# Patient Record
Sex: FEMALE | Race: WHITE
[De-identification: names, ages, dates, MRNs, and addresses within clinical notes are randomized per-mention and may not be internally consistent; named-entity substitution may affect disease eponyms.]

---

## 2021-05-10 NOTE — P.OP
Date of Procedure: 05/10/21


Preoperative Diagnosis: 


Ruptured right ectopic pregnancy with hemoperitoneum


Postoperative Diagnosis: 


Same


Procedure(s) Performed: 


#1: Exploratory laparotomy.  2: Right salpingectomy with excision of ectopic 

pregnancy


Anesthesia: MENA


Surgeon: Enrique Mosher


Assistant #1: Eunice Tejada


Estimated Blood Loss (ml): 50


Pathology: other (Right fallopian tube with ectopic pregnancy)


Condition: stable


Disposition: PACU


Indications for Procedure: 


please see dictated H&P for intimate details of this patient's admission.  In 

brief summary this is a pleasant 30-year-old  1 para 0 female 

approximately 8 weeks pregnant who is transferred by EMS from [facility]

with suspected ruptured right ectopic pregnancy, hemoperitoneum, and 

hypotension.  Patient was getting 2 units of blood on arrival to the emergency 

department and evaluation is consistent with a ruptured ectopic pregnancy.  I 

discuss recently with her and her  and we elected proceed immediately 

with exploratory laparotomy possible excision of her right fallopian tube.  I 

did explain the surgery and risks and risks of infection, bleeding, possible 

injury bowel, bladder, vessels, and/or other organs.  All the patient's 

questions are answered written consent is obtained.


Operative Findings: 


This patient had approximately 2000 mL of hemoperitoneum.  There was active 

bleeding from what appeared to be a right ruptured ectopic pregnancy.  The 

ectopic pregnancy encompassing entire tube right at the cornual insertion of the

uterus.  The uterus appeared normal.  The left fallopian tube and ovary appeared

normal.  The right ovary appear normal.


Description of Procedure: 


This patient is taken to the operating room where she is laid in the supine 

position.  She subsequently undergoes general endotracheal anesthesia without 

incident.  With an adequate level of anesthesia she has an abdominal prep and 

drape.  She has a Lane catheter placed to straight drain.  Scalpel is taken and

a Pfannenstiel skin incision is then made.  A second scalpel is taken down the 

fascia and the fascia scored with a knife.  Fascial incision extended 

bilaterally using the Mcdowell scissors.  Fascia is then dissected sharply off the 

rectus muscles.  The rectus muscles are  the peritoneum identified and 

entered sharply.  Immediately at this time there is loss of a large amount of 

dark red blood.  Peritoneal incision is extended.  Copious amount of blood and 

clots are removed at this time.  Paterson retractor is then placed.  The bowel 

was packed up out of the pelvis with a wet laparotomy sponge.  Inspection of the

pelvis shows a normal uterus and left tube and ovary.  The right fallopian tube 

is completely encompassed by an ectopic pregnancy which appears to have 

ruptured.  There is some active bleeding going on.  At this point it is evident 

due to the location of the ectopic and the's large size of the ectopic that is 

impossible to salvage the right fallopian tube and is in this best interest of 

this patient to remove the right fallopian tube.  2 curved Heaneys are placed 

across the right fallopian tube.  This does stop the bleeding and the tube was 

excised with the ectopic pregnancy.  Figure-of-eight sutures are done with 0 

Vicryl 2 for added hemostasis including a free tie.  Excellent hemostasis is 

noted.  At this time we carefully evacuate the rest of the hemoperitoneum.  

Careful irrigation is done as well.  Final inspection of the right adnexa shows 

to be hemostatic.  This done I retractor and sponges are removed.  All counts 

are correct 3.  The peritoneum was then closed using an 0 Vicryl.  Rectus musc

les reapproximated in 0 Vicryl interrupted fashion.  Fascia is then closed using

0 PDS.  Subcutaneous tissues and closed using a 3-0 Vicryl.  Skin is and closed 

using staples.  All counts are correct 3.  There are no complications.  Patient

is awakened from anesthesia and taken recovery room in satisfactory condition.

## 2021-05-10 NOTE — P.HPOB
History of Present Illness


H&P Date: 05/10/21


Chief Complaint: Abdominal pain, ruptured ectopic pregnancy





This patient is a 30-year-old  1 para 0 female who was transferred from 

[facility] at this time for evaluation of ruptured ectopic pregnancy, 

hypotension.  Patient is alert and states that she knew she was pregnant and 

that she did see a OB/GYN doctor but not at this facility.  She began having 

severe abdominal pain today and went to the local hospital.  At that time she 

was noted to have a ectopic with crown-rump length in the right adnexa and a 

large area of heterogeneous.  Patient was hypotensive and tachycardic subsequent

given 2 units of blood and transferred here.  Patient continues to be 

symptomatic with an acute abdomen.  Patient states she has no other significant 

past medical history.





Review of Systems


Genitourinary: Reports as per HPI





Past Medical History


Past Medical History: No Reported History


History of Any Multi-Drug Resistant Organisms: None Reported


Past Surgical History: No Surgical Hx Reported


Past Psychological History: Anxiety, Depression


Smoking Status: Never smoker


Past Alcohol Use History: None Reported


Past Drug Use History: None Reported





Medications and Allergies


                                    Allergies











Allergy/AdvReac Type Severity Reaction Status Date / Time


 


No Known Allergies Allergy   Verified 05/10/21 15:17














Exam


                                   Vital Signs











  Temp Pulse Resp BP Pulse Ox


 


 05/10/21 15:25   94  18  97/53  100


 


 05/10/21 15:13  97.5 F L  107 H  18  94/60  100








                                Intake and Output











 05/10/21 05/10/21 05/10/21





 06:59 14:59 22:59


 


Other:   


 


  Weight   70.307 kg














Results





Ultrasound shows a 7 week 5 day crown-rump length right adnexa with a large area

of heterogeneous echotexture consistent with probably blood.





Assessment and Plan


Assessment: 





This is a 30-year-old  1 para 0 female approximately 8 weeks with what 

appears to be a ruptured right ectopic pregnancy and hypotension with 

tachycardia status post 2 units of blood.  I discussed with the patient and her 

partner and we elected to proceed immediately to the operating room for 

exploratory laparotomy.  I did discuss the possibility that she may end up 

having to have her right fallopian tube removed.  I also discussed briefly the 

risks of surgery.  All the patient's questions are answered and a consent is 

obtained.


(1) Ruptured right tubal ectopic pregnancy causing hemoperitoneum


Current Visit: Yes   Status: Acute   Code(s): O00.101 - RIGHT TUBAL PREGNANCY 

WITHOUT INTRAUTERINE PREGNANCY; K66.1 - HEMOPERITONEUM   SNOMED Code(s): 

44041058

## 2021-05-10 NOTE — ED
General Adult HPI





- General


Stated complaint: ectopic pregnancy


Time Seen by Provider: 05/10/21 15:14


Source: patient


Mode of arrival: EMS


Limitations: no limitations





- History of Present Illness


Initial comments: 


Dictation was produced using dragon dictation software. please excuse any 

grammatical, word or spelling errors. 





This patient was cared for during a federal and state declared state of 

emergency secondary to Covid 19





Chief Complaint: 30-year-old feel presents to our ER for ectopic pregnancy





History of Present Illness: Patient is a 30-year-old female presents to the 

emergency department for ectopic pregnancy.  Patient was initially evaluated at 

[facility] where she was found to have an ectopic pregnancy.  Should 

borderline blood pressures there.  She was given 2 boluses of IV fluids and 

started on blood transfusion.  Patient has a gynecologist named Dr. Beatty and 

outside city.  Patient's pain initially started today after having sexual 

intercourse.  Patient had an ultrasound performed at [facility] and 

showed ectopic pregnancy.








The ROS documented in this emergency department record has been reviewed and 

confirmed by me.  Those systems with pertinent positive or negative responses 

have been documented in the HPI.  All other systems are other negative and/or 

noncontributory.








PHYSICAL EXAM:


General Impression: Alert and oriented x3, pale, lethargic


HEENT: Normocephalic atraumatic, extra-ocular movements intact, pupils equal and

reactive to light bilaterally, mucous membranes moist.


Cardiovascular: Heart regular rate and rhythm


Chest: Able to complete full sentences, no retractions, no tachypnea


Abdomen: abdomen soft, mild diffuse tenderness, non-distended, no organomegaly


Musculoskeletal: Pulses present and equal in all extremities, no peripheral 

edema


Motor:  no focal deficits noted


Neurological: CN II-XII grossly intact, no focal motor or sensory deficits noted


Skin: Intact with no visualized rashes


Psych: Normal affect and mood





ED course: 22-year-old female presents emergency department for ectopic 

pregnancy.  Vital signs upon arrival shows heart rate 107, blood pressure 94/60,

rest of vital signs within acceptable limits.  OB/GYN was called prior to 

patient's arrival.  Case discussed with Dr. Mosher will take patient to the 

operating room.




















- Related Data


                                    Allergies











Allergy/AdvReac Type Severity Reaction Status Date / Time


 


No Known Allergies Allergy   Verified 05/10/21 15:17














Review of Systems


ROS Statement: 


Those systems with pertinent positive or pertinent negative responses have been 

documented in the HPI.





ROS Other: All systems not noted in ROS Statement are negative.





Past Medical History


Past Medical History: No Reported History


History of Any Multi-Drug Resistant Organisms: None Reported


Past Surgical History: No Surgical Hx Reported


Past Psychological History: Anxiety, Depression


Smoking Status: Never smoker


Past Alcohol Use History: None Reported


Past Drug Use History: None Reported





General Exam


Limitations: no limitations





Course





                                   Vital Signs











  05/10/21





  15:13


 


Temperature 97.5 F L


 


Pulse Rate 107 H


 


Respiratory 18





Rate 


 


Blood Pressure 94/60


 


O2 Sat by Pulse 100





Oximetry 














Disposition


Clinical Impression: 


 Ectopic pregnancy





Disposition: ADMITTED AS IP TO THIS HOSP


Condition: Critical


Referrals: 


Sisi Gloria, PAC [Primary Care Provider] - 1-2 days


Decision Time: 15:20

## 2021-05-11 NOTE — P.PN
Progress Note - Text


Progress Note Date: 05/11/21





Postoperative day #1.  Patient is resting without new complaints.  Vital signs 

are stable and she is afebrile.  Repeat CBC last evening showed a hemoglobin 

11.6.  White count was still elevated this thought to be secondary to the 

hemoperitoneum.  I am going to continue her IV antibiotics, check a CBC this 

morning, discontinue her catheter, and encourage ambulation.  We will also 

advance her diet.

## 2021-05-12 NOTE — P.DS
Providers


Date of admission: 


05/10/21 15:21





Expected date of discharge: 21


Attending physician: 


Enrique Mosher





Primary care physician: 


Sisi Gloria








- Discharge Diagnosis(es)


(1) Ruptured right tubal ectopic pregnancy causing hemoperitoneum


Current Visit: Yes   Status: Acute   


Hospital Course: 





Please see dictated H&P for intimate details of this patient's admission.  Brief

summary this is a pleasant 30-year-old  1 para 0 female transferred from 

[facility] for ruptured ectopic pregnancy.  Patient was immediately 

taken to the operating room and exploratory laparotomy was done for a large 

hemoperitoneum with ruptured right ectopic pregnancy.  Please see dictated 

operative note.  Postoperatively the patient did well and serial CBCs level 

about the time of this dictation 8.3.  Patient is tolerating regular diet and 

having adequate pain control.  Still for discharge home follow up with me in 1 

week.


Procedures: 





Exploratory laparotomy and right salpingectomy with excision of right ectopic 

pregnancy


Patient Condition at Discharge: Critical





Plan - Discharge Summary


New Discharge Prescriptions: 


New


   Ferrous Sulfate [Iron (65 MG Elemental)] 325 mg PO BID-W/MEALS #60 tab


   Ibuprofen [Motrin] 600 mg PO Q6HR PRN #30 tab


     PRN Reason: Mild Discomfort


   Acetaminophen-Codeine 300-30mg [Tylenol w/codeine #3] 2 each PO Q6HR PRN #24 

tab


     PRN Reason: Severe Pain


Discharge Medication List





Acetaminophen-Codeine 300-30mg [Tylenol w/codeine #3] 2 each PO Q6HR PRN #24 tab

21 [Rx]


Ferrous Sulfate [Iron (65 MG Elemental)] 325 mg PO BID-W/MEALS #60 tab 21 

[Rx]


Ibuprofen [Motrin] 600 mg PO Q6HR PRN #30 tab 21 [Rx]








Follow up Appointment(s)/Referral(s): 


Enrique Mosher MD [STAFF PHYSICIAN] - 6 Weeks (Please see me in 1 week for an 

incision check.)


Patient Instructions/Handouts:  Exploratory Laparotomy (DC), Salpingectomy (DC),

Ectopic Pregnancy (GEN)


Activity/Diet/Wound Care/Special Instructions: 


No heavy lifting or strenuous activity for 6 weeks.  No intercourse or anything 

per vagina for 6 weeks.  Please call if any fever, chills, excessive vaginal 

bleeding, and/or abdominal pain.


Discharge Disposition: HOME SELF-CARE

## 2021-05-12 NOTE — P.PN
Progress Note - Text


Progress Note Date: 05/12/21





Postoperative day #2.  Patient is resting without new complaints.  Vital signs 

are stable she's afebrile.  Incision is intact and dry.  Hemoglobin yesterday 

was 8.3 which is felt to be appropriate for the amount of blood she lost her to 

surgery.  Patient is tolerating regular diet, ambulating, urinating without 

difficulty.  She wishes to go home.  Plan today is to repeat her CBC and if 

continues to do well discharge home this morning after breakfast.

## 2022-12-17 ENCOUNTER — HOSPITAL ENCOUNTER (OUTPATIENT)
Dept: HOSPITAL 47 - FBPOP | Age: 32
End: 2022-12-17
Attending: OBSTETRICS & GYNECOLOGY
Payer: COMMERCIAL

## 2022-12-17 VITALS
DIASTOLIC BLOOD PRESSURE: 69 MMHG | HEART RATE: 88 BPM | SYSTOLIC BLOOD PRESSURE: 114 MMHG | RESPIRATION RATE: 16 BRPM | TEMPERATURE: 97.6 F

## 2022-12-17 DIAGNOSIS — Z3A.39: ICD-10-CM

## 2022-12-17 PROCEDURE — 59025 FETAL NON-STRESS TEST: CPT

## 2022-12-17 PROCEDURE — 99213 OFFICE O/P EST LOW 20 MIN: CPT

## 2022-12-19 ENCOUNTER — HOSPITAL ENCOUNTER (OUTPATIENT)
Dept: HOSPITAL 47 - FBPOP | Age: 32
Discharge: HOME | End: 2022-12-19
Attending: OBSTETRICS & GYNECOLOGY
Payer: COMMERCIAL

## 2022-12-19 VITALS
TEMPERATURE: 97.5 F | DIASTOLIC BLOOD PRESSURE: 76 MMHG | HEART RATE: 85 BPM | RESPIRATION RATE: 18 BRPM | SYSTOLIC BLOOD PRESSURE: 117 MMHG

## 2022-12-19 DIAGNOSIS — O47.1: Primary | ICD-10-CM

## 2022-12-19 DIAGNOSIS — Z3A.39: ICD-10-CM

## 2022-12-19 PROCEDURE — 99213 OFFICE O/P EST LOW 20 MIN: CPT

## 2022-12-19 PROCEDURE — 59025 FETAL NON-STRESS TEST: CPT

## 2022-12-19 PROCEDURE — 84112 EVAL AMNIOTIC FLUID PROTEIN: CPT

## 2022-12-20 ENCOUNTER — HOSPITAL ENCOUNTER (INPATIENT)
Dept: HOSPITAL 47 - FBPOP | Age: 32
LOS: 2 days | Discharge: HOME | End: 2022-12-22
Attending: OBSTETRICS & GYNECOLOGY | Admitting: OBSTETRICS & GYNECOLOGY
Payer: COMMERCIAL

## 2022-12-20 DIAGNOSIS — Z79.899: ICD-10-CM

## 2022-12-20 DIAGNOSIS — Z67.11: ICD-10-CM

## 2022-12-20 DIAGNOSIS — Z3A.39: ICD-10-CM

## 2022-12-20 DIAGNOSIS — Z28.310: ICD-10-CM

## 2022-12-20 DIAGNOSIS — Z86.59: ICD-10-CM

## 2022-12-20 DIAGNOSIS — O26.893: ICD-10-CM

## 2022-12-20 LAB
BASOPHILS # BLD AUTO: 0 K/UL (ref 0–0.2)
BASOPHILS NFR BLD AUTO: 0 %
EOSINOPHIL # BLD AUTO: 0.1 K/UL (ref 0–0.7)
EOSINOPHIL NFR BLD AUTO: 1 %
ERYTHROCYTE [DISTWIDTH] IN BLOOD BY AUTOMATED COUNT: 4.32 M/UL (ref 3.8–5.4)
ERYTHROCYTE [DISTWIDTH] IN BLOOD: 15.5 % (ref 11.5–15.5)
HCT VFR BLD AUTO: 37 % (ref 34–46)
HGB BLD-MCNC: 12.1 GM/DL (ref 11.4–16)
LYMPHOCYTES # SPEC AUTO: 1.2 K/UL (ref 1–4.8)
LYMPHOCYTES NFR SPEC AUTO: 7 %
MCH RBC QN AUTO: 28 PG (ref 25–35)
MCHC RBC AUTO-ENTMCNC: 32.7 G/DL (ref 31–37)
MCV RBC AUTO: 85.5 FL (ref 80–100)
MONOCYTES # BLD AUTO: 0.4 K/UL (ref 0–1)
MONOCYTES NFR BLD AUTO: 3 %
NEUTROPHILS # BLD AUTO: 13.8 K/UL (ref 1.3–7.7)
NEUTROPHILS NFR BLD AUTO: 88 %
PLATELET # BLD AUTO: 246 K/UL (ref 150–450)
WBC # BLD AUTO: 15.7 K/UL (ref 3.8–10.6)

## 2022-12-20 PROCEDURE — 86870 RBC ANTIBODY IDENTIFICATION: CPT

## 2022-12-20 PROCEDURE — 86850 RBC ANTIBODY SCREEN: CPT

## 2022-12-20 PROCEDURE — 3E0234Z INTRODUCTION OF SERUM, TOXOID AND VACCINE INTO MUSCLE, PERCUTANEOUS APPROACH: ICD-10-PCS

## 2022-12-20 PROCEDURE — 85461 HEMOGLOBIN FETAL: CPT

## 2022-12-20 PROCEDURE — 99213 OFFICE O/P EST LOW 20 MIN: CPT

## 2022-12-20 PROCEDURE — 85025 COMPLETE CBC W/AUTO DIFF WBC: CPT

## 2022-12-20 PROCEDURE — 84112 EVAL AMNIOTIC FLUID PROTEIN: CPT

## 2022-12-20 PROCEDURE — 86880 COOMBS TEST DIRECT: CPT

## 2022-12-20 PROCEDURE — 59025 FETAL NON-STRESS TEST: CPT

## 2022-12-20 PROCEDURE — 86900 BLOOD TYPING SEROLOGIC ABO: CPT

## 2022-12-20 PROCEDURE — 86901 BLOOD TYPING SEROLOGIC RH(D): CPT

## 2022-12-20 RX ADMIN — DOCUSATE SODIUM AND SENNOSIDES SCH EACH: 50; 8.6 TABLET ORAL at 22:09

## 2022-12-20 RX ADMIN — POTASSIUM CHLORIDE SCH MLS/HR: 14.9 INJECTION, SOLUTION INTRAVENOUS at 11:03

## 2022-12-20 RX ADMIN — POTASSIUM CHLORIDE SCH MLS/HR: 14.9 INJECTION, SOLUTION INTRAVENOUS at 22:45

## 2022-12-20 RX ADMIN — ACETAMINOPHEN SCH MG: 500 TABLET ORAL at 22:09

## 2022-12-20 NOTE — P.HPOB
History of Present Illness


H&P Date: 22


Chief Complaint: Leaking fluid and contractions





This patient is a pleasant 31-year-old  2 para 0 female estimated date of

confinement 2022 estimated gestational age 39-3/7 weeks who presents to 

labor and delivery with complaints of leaking fluid and contractions.  Patient's

found to have a positive amnio sure in active early labor.  Prenatal care has 

been uncomplicated.





Review of Systems


Genitourinary: Reports pregnant


Menstruation: Reports amenorrhea





Past Medical History


Past Medical History: No Reported History


Additional Past Medical History / Comment(s): Chronic Bronchitits


History of Any Multi-Drug Resistant Organisms: None Reported


Additional Past Surgical History / Comment(s): Patient had a right salpingectomy

with excision of ectopic pregnancy and May of last year.  Patient also had a 

cyst removed from her lip.


Past Anesthesia/Blood Transfusion Reactions: No Reported Reaction


Past Psychological History: Anxiety, Depression


Smoking Status: Never smoker


Past Alcohol Use History: None Reported


Past Drug Use History: None Reported





- Past Family History


  ** Mother


Family Medical History: Cancer, Chest Pain / Angina, Congestive Heart Failure 

(CHF), Coronary Artery Disease (CAD)





Medications and Allergies


                                Home Medications











 Medication  Instructions  Recorded  Confirmed  Type


 


Prenatal Vit No.179/Iron/Folic 1 tab PO DAILY 22 History





[Prenatal Tablet]    








                                    Allergies











Allergy/AdvReac Type Severity Reaction Status Date / Time


 


No Known Allergies Allergy   Verified 22 04:59














Exam


                                   Vital Signs











  Temp Pulse Resp BP Pulse Ox


 


 22 05:16  97.2 F L  90  16  118/82  99


 


 22 05:00  97.2 F L  90  16  118/82  99








                                Intake and Output











 22





 14:59 22:59 06:59


 


Other:   


 


  Weight   83.007 kg














- OBG Physical Exam


Abdomen: bowel sounds normal, no diffuse tenderness, no bruit present, no 

guarding noted, no hepatomegaly, no splenomegaly, no mass


Vulva: both: normal


Cervix: no lesion (Cervix is 4-5 cm dilated completely effaced -1 station.), no 

discharge





Results





Prenatal blood work shows she is A-, rubella immune, RPR is nonreactive, 

hepatitis B negative, HIV is nonreactive, group B strep was negative, Glucola 

was abnormal but she had a normal three-hour gtt., patient received RhoGAM on 

.  Most recent ultrasound showed estimated fetal weight of 6 pounds at

the 50th percentile.


Result Diagrams: 


                                 22 05:27





                  Abnormal Lab Results - Last 24 Hours (Table)











  22 Range/Units





  05:27 


 


WBC  15.7 H  (3.8-10.6)  k/uL


 


Neutrophils #  13.8 H  (1.3-7.7)  k/uL














Assessment and Plan


Assessment: 





This is a pleasant 31-year-old  2 para 0 female 39-3/7 weeks who presents

to labor and delivery with spontaneous rupture membranes and active labor.  Plan

is anticipate vaginal delivery.


(1) 39 weeks gestation of pregnancy


Current Visit: Yes   Status: Acute   Code(s): Z3A.39 - 39 WEEKS GESTATION OF 

PREGNANCY   SNOMED Code(s): 51317007


   





(2) Spontaneous rupture of amniotic membranes


Current Visit: Yes   Status: Acute   Code(s): RYM8953 -    SNOMED Code(s): 

172989258


   





(3) Normal labor


Current Visit: Yes   Status: Acute   Code(s): O80 - ENCOUNTER FOR FULL-TERM 

UNCOMPLICATED DELIVERY; Z37.9 - OUTCOME OF DELIVERY, UNSPECIFIED   SNOMED 

Code(s): 30642842

## 2022-12-20 NOTE — P.MSEPDOC
Presenting Problems





- Arrival Data


Date of Arrival on Unit: 22


Time of Arrival on Unit: 05:06


Mode of Transport: Wheelchair





- Complaint


OB-Reason for Admission/Chief Complaint: Possible Onset of Labor


Comment: RN spoke with Dr. Mosher at 0456 regarding positive amnisure test, 

3.5/90/-2 cervical check. Orders given to RN  Patient will be admitted for 

labor.





Prenatal Medical History





- Pregnancy Information


: 2


Para: 0


Term: 0


: 0


Abortions: Spontaneous or Elective: 0


Number of Living Children: 0





- Gestational Age


Gestational Age by MILE (wks/days): 39 Weeks and 3 Days





Review of Systems





- Review of Systems


Constitutional: No problems


Breast: No problems


ENT: No problems


Cardiovascular: No problems


Respiratory: No problems


Gastrointestinal: No problems


Genitourinary: No problems


Musculoskeletal: No problems


Neurological: No problems


Skin: No problems





Vital Signs





- Temperature


Temperature: 97.2 F


Temperature Source: Temporal Artery Scan





- Pulse


  ** Pulse Oximetery


Pulse Rate: 90


Pulse Assessment Method: Automatic Cuff





- Respirations


Respiratory Rate: 16


Oxygen Delivery Method: Room Air


O2 Sat by Pulse Oximetry: 99





- Blood Pressure


  ** Right Arm


Blood Pressure: 118/82


Blood Pressure Mean: 94


Blood Pressure Source: Automatic Cuff





Medical Screen Scoring





- Cervical Exam


Dilation (cm): 3.5


Effacement (%): 90


Station: -2


Membranes: Ruptured





- Uterine Contractions


Frequency From (mins): 2


Frequency To (mins): 3


Duration From (seconds): 60


Duration To (seconds): 70


Intensity: Moderate


Resting: Soft to palpation





- Fetal Assessment - Baby A


Baseline FHR: 145


Fetal Heart Rate - NICHD Category: Category I (Normal)


NST: Reactive





Physician Notification





- Physician Notified


Physician Notified Date: 22


Physician Notified Time: 04:56


Physician: Dr. Davies


New Order Received: Yes





- Notification Comment


Comment: RN spoke with Dr. Mosher at 0456 regarding positive amnisure test, 

3.5/90/-2 cervical check. Orders given to RN  Patient will be admitted for 

labor.





Maternal Fetal Triage Index





- Non-Urgent/Priority 4


Non-Urgent Priority 4: Yes


Criteria Met for Priority 4: RN spoke with Dr. Mosher at 0456 regarding 

positive amnisure test, 3.5/90/-2 cervical check. Orders given to RN  Patient 

will be admitted for labor.





Disposition





- Disposition


OB Disposition: Admit


I agree with the RN Medical Screening Exam: Yes


Case reviewed; plan agreed upon as documented in EMR&OBIX.: Yes


Diagnosis: ENCOUNTER FOR FULL-TERM UNCOMPLICATED DELIVERY

## 2022-12-20 NOTE — P.MSEPDOC
Presenting Problems





- Arrival Data


Date of Arrival on Unit: 22


Time of Arrival on Unit: 12:30


Mode of Transport: Ambulatory





- Complaint


OB-Reason for Admission/Chief Complaint: Rule Out SROM





Prenatal Medical History





- Pregnancy Information


: 2


Para: 0


Term: 0


: 0


Abortions: Spontaneous or Elective: 1


Number of Living Children: 0





- Gestational Age


Gestational Age by MILE (wks/days): 39 Weeks and 2 Days





- Prenatal History


Comment: HX ectopic pregnancy





Review of Systems





- Review of Systems


Constitutional: No problems


Breast: No problems


ENT: No problems


Cardiovascular: No problems


Respiratory: No problems


Gastrointestinal: No problems


Genitourinary: No problems


Musculoskeletal: No problems


Neurological: No problems


Skin: No problems





Vital Signs





- Temperature


Temperature: 97.5 F


Temperature Source: Temporal Artery Scan





- Pulse


  ** Right Sitting Brachial


Pulse Rate: 85


Pulse Assessment Method: Automatic Cuff





- Respirations


Respiratory Rate: 18


Oxygen Delivery Method: Room Air


O2 Sat by Pulse Oximetry: 98





- Blood Pressure


  ** Right Arm Sitting


Blood Pressure: 117/76


Blood Pressure Mean: 89





Medical Screen Scoring





- Cervical Exam


Dilation (cm): 3


Effacement (%): 70


Station: -2


Membranes: Intact





- Uterine Contractions


Frequency From (mins): 5


Frequency To (mins): 5


Duration From (seconds): 8


Duration To (seconds): 60


Intensity: Mild


Resting: Soft to palpation





- Fetal Assessment - Baby A


Baseline FHR: 135


Fetal Heart Rate - NICHD Category: Category I (Normal)


NST: Reactive





Physician Notification





- Physician Notified


Physician Notified Date: 22


Physician Notified Time: 13:15


Physician: Enrique Mosher


New Order Received: Yes





- Notification Comment


Comment: ID home. Follow up in the office as scheduled.





Maternal Fetal Triage Index





- Maternal Fetal Triage Index


Presenting for scheduled procedure w/no complaint: No





- Stat/Priority 1


Stat Priority 1: No





- Urgent/Priority 2


Urgent Priority 2: No





- Prompt/Priority 3


Prompt Priority 3: No





- Non-Urgent/Priority 4


Non-Urgent Priority 4: Yes


Criteria Met for Priority 4: amnisure neg.





Disposition





- Disposition


OB Disposition: Discharge to home


Discharge Date: 22


Discharge Time: 13:18


I agree with the RN Medical Screening Exam: Yes


Case reviewed; plan agreed upon as documented in EMR&OBIX.: Yes


Diagnosis: FALSE LABOR AT OR AFTER 37 COMPLETED WEEKS OF GESTATION

## 2022-12-20 NOTE — P.OP
Date of Procedure: 22


Preoperative Diagnosis: 


1.  at 39 weeks 1 day


2. arrest of descent


3.maternal fever


Postoperative Diagnosis: 


1.  at 39 weeks 1 day


2. arrest of descent


3.maternal fever


4. LOT position


Procedure(s) Performed: 


primary low transverse 


Anesthesia: spinal


Surgeon: Nahomy Barney


Assistant #1: Eda Vences


Estimated Blood Loss (ml): 700


IV fluids (ml): 1,000


Urine output (ml): 200


Pathology: none sent


Condition: stable


Disposition: floor


Indications for Procedure: 


31-year-old  presented at 39 weeks and 1 day with spontaneous rupture of 

membranes.  She progressed quite quickly to 8 cm and got an epidural was 

comfortable with that.  She refused Pitocin and when she was ruptured for 

prolonged amount of time she also refused antibiotics.  Maternal temperature to 

100.6 as she was pushing for 2-1/2-3 hours.  She was no longer making progress 

and started becoming fatigued. patient sent her to follow was held and the need 

for expedited Delivery was discussed with the patient and her family.  It was 

her clinical recommendation to proceed with  delivery and after 

questions were answered the patient agreed to proceed with the recommended plan.


Operative Findings: 


viable male, Apgars 9, 9, weight 7 lbs. 15 oz.  Normal uterus, tubes, ovaries.


Description of Procedure: 


Patient was taken to the operating room where spinal anesthesia was found be 

adequate.  She was prepped and draped in normal sterile fashion in dorsal supine

position with a leftward tilt.  Pfannenstiel skin incision was made the scalpel 

and carried through to the underlying layer of fascia with the scalpel.  Fascia 

was incised in midline and carried bilaterally with the Mcdowell scissors.  The 

superior aspect of the fascial incision was grasped with Glen clamps elevated 

and the underlying rectus muscles dissected off with the Mcdowell's.  Attention was 

then turned to inferior aspect of same incision which in a similar fashion was 

grasped tented up and the underlying rectus muscles dissected off with the 

Mcdowell's.  The rectus muscles were  the midline and the peritoneum was 

identified tented up and entered sharply with the scalpel.  The incision was 

extended superiorly and inferiorly with good visualization of the bladder.  The 

bladder blade was inserted and the vesicouterine peritoneum was incised the 

Metzenbaums then carried bilaterally and bladder flap created digitally.  A low 

transverse incision was then made on the uterus with the scalpel.  This was 

carried bilaterally and digital manner.  Infant's head delivered atraumatically,

nose and mouth bulb suctioned, cord clamped and cut, infant handed off to 

waiting nurses.  Apgars 9,9, weight 7 lbs. 15 oz.  Placenta delivered manually, 

intact with three-vessel cord.  The uterus is exteriorized and cleared of all 

clots and debris.  The uterine incision was closed with 0 Vicryl in a running 

locked fashion.  Second layer of the same sutures used in imbricating fashion to

obtain excellent hemostasis.  Bladder flap was then reapproximated using 2-0 

Vicryl in a running fashion.  Both ovaries and tubes appeared normal.  The 

uterus was placed back into the abdomen.  The peritoneum was reapproximated 

using 2-0 Vicryl in a running fashion.  The muscles were reapproximated using 2-

0 Vicryl in interrupted fashion.  The fascia was reapproximated using 0 Vicryl 

in a running fashion.  The subcutaneous tissues closed with 3-0 Vicryl running 

fashion.  The skin was closed staples.  Patient tolerated the procedure well, 

sponge and instrument counts were correct times 2 and she was taken to the 

recovery room in stable condition.

## 2022-12-21 VITALS — RESPIRATION RATE: 16 BRPM

## 2022-12-21 LAB
BASOPHILS # BLD AUTO: 0 K/UL (ref 0–0.2)
BASOPHILS NFR BLD AUTO: 0 %
EOSINOPHIL # BLD AUTO: 0 K/UL (ref 0–0.7)
EOSINOPHIL NFR BLD AUTO: 0 %
ERYTHROCYTE [DISTWIDTH] IN BLOOD BY AUTOMATED COUNT: 2.82 M/UL (ref 3.8–5.4)
ERYTHROCYTE [DISTWIDTH] IN BLOOD: 15.3 % (ref 11.5–15.5)
HCT VFR BLD AUTO: 24.6 % (ref 34–46)
HGB BLD-MCNC: 8 GM/DL (ref 11.4–16)
LYMPHOCYTES # SPEC AUTO: 1.3 K/UL (ref 1–4.8)
LYMPHOCYTES NFR SPEC AUTO: 8 %
MCH RBC QN AUTO: 28.5 PG (ref 25–35)
MCHC RBC AUTO-ENTMCNC: 32.7 G/DL (ref 31–37)
MCV RBC AUTO: 87.2 FL (ref 80–100)
MONOCYTES # BLD AUTO: 0.5 K/UL (ref 0–1)
MONOCYTES NFR BLD AUTO: 3 %
NEUTROPHILS # BLD AUTO: 14.7 K/UL (ref 1.3–7.7)
NEUTROPHILS NFR BLD AUTO: 88 %
PLATELET # BLD AUTO: 176 K/UL (ref 150–450)
WBC # BLD AUTO: 16.6 K/UL (ref 3.8–10.6)

## 2022-12-21 RX ADMIN — IBUPROFEN SCH: 600 TABLET ORAL at 20:17

## 2022-12-21 RX ADMIN — POTASSIUM CHLORIDE SCH: 14.9 INJECTION, SOLUTION INTRAVENOUS at 07:58

## 2022-12-21 RX ADMIN — Medication SCH MG: at 08:08

## 2022-12-21 RX ADMIN — ACETAMINOPHEN SCH MG: 500 TABLET ORAL at 08:09

## 2022-12-21 RX ADMIN — POTASSIUM CHLORIDE SCH: 14.9 INJECTION, SOLUTION INTRAVENOUS at 22:39

## 2022-12-21 RX ADMIN — ACETAMINOPHEN SCH: 500 TABLET ORAL at 23:01

## 2022-12-21 RX ADMIN — DOCUSATE SODIUM AND SENNOSIDES SCH EACH: 50; 8.6 TABLET ORAL at 20:18

## 2022-12-21 RX ADMIN — IBUPROFEN SCH: 600 TABLET ORAL at 13:30

## 2022-12-21 RX ADMIN — ACETAMINOPHEN SCH: 500 TABLET ORAL at 12:29

## 2022-12-21 RX ADMIN — IBUPROFEN SCH: 600 TABLET ORAL at 08:49

## 2022-12-21 RX ADMIN — Medication SCH MG: at 18:33

## 2022-12-21 RX ADMIN — IBUPROFEN SCH MG: 600 TABLET ORAL at 23:01

## 2022-12-21 RX ADMIN — DOCUSATE SODIUM AND SENNOSIDES SCH EACH: 50; 8.6 TABLET ORAL at 08:09

## 2022-12-21 RX ADMIN — POTASSIUM CHLORIDE SCH: 14.9 INJECTION, SOLUTION INTRAVENOUS at 13:30

## 2022-12-21 RX ADMIN — IBUPROFEN SCH: 600 TABLET ORAL at 04:43

## 2022-12-21 RX ADMIN — ACETAMINOPHEN SCH MG: 500 TABLET ORAL at 16:13

## 2022-12-21 NOTE — P.PNOBGPC
Subjective





- Subjective


Patient reports: Reports appetite normal, Reports voiding normally, Reports pain

well controlled, Reports ambulating normally


[place]: doing well





Objective





- Vital Signs


Latest vital signs: 


                                   Vital Signs











  Temp Pulse Resp BP Pulse Ox


 


 22 04:00  98.2 F  92  16  100/65  94 L


 


 22 00:00  98.4 F  91  19  97/63 


 


 22 21:30  97.2 F L  89  16  122/66 


 


 22 21:00  97.0 F L  72  16  106/61  95


 


 22 20:30  97.0 F L   15  113/61  97


 


 22 20:15  97.0 F L  80  15  109/59  97


 


 22 20:00   77  16  105/54  96


 


 22 19:45   85  15  110/54  97


 


 22 19:30  96.6 F L  82  14  113/74  97


 


 22 19:15   87  16  97/56  95


 


 22 06:52  97.2 F L  90  16  118/82  99








                                Intake and Output











 22





 14:59 22:59 06:59


 


Intake Total  480 


 


Output Total  1891 300


 


Balance  -1411 -300


 


Intake:   


 


  Oral  480 


 


Output:   


 


  Urine  200 300


 


  Estimated Blood Loss  700 


 


  Output, Quantitative  991 





  Blood Loss   


 


Other:   


 


  Voiding Method  Self-Catheterization Indwelling Catheter














- Exam


Lungs: bilateral: normal


Chest: Normal S1, Normal S2


Extremities: Present: normal


Abdomen: Present: normal appearance, soft.  Absent: distention, tenderness


Incision: Present: normal, dry, intact


Uterus: Present: normal, firm





Assessment and Plan


Assessment: 





Postoperative day #1.  Patient is resting without new complaints.  Vital signs 

are stable she's afebrile.  Uterus is firm nontender and her incision is intact 

and dry.  CBC is pending at this time of dictation.  Plan today is to check a 

CBC, I am going to start her on some oral iron therapy because she did have a 

significant amount of blood loss.  I also I'm going to give her 2 extra doses of

antibiotics due to the length of her labor and prolonged pushing.  We will 

advance her diet and allow her to shower.  In general she is doing well we'll 

continue routine postoperative care.


(1) 39 weeks gestation of pregnancy


Current Visit: Yes   Status: Acute   Code(s): Z3A.39 - 39 WEEKS GESTATION OF 

PREGNANCY   SNOMED Code(s): 18927119


   





(2) Spontaneous rupture of amniotic membranes


Current Visit: Yes   Status: Acute   Code(s): GMA0357 -    SNOMED Code(s): 

148029558


   





(3) Normal labor


Current Visit: Yes   Status: Acute   Code(s): O80 - ENCOUNTER FOR FULL-TERM 

UNCOMPLICATED DELIVERY; Z37.9 - OUTCOME OF DELIVERY, UNSPECIFIED   SNOMED 

Code(s): 78743252

## 2022-12-21 NOTE — P.PN
Progress Note - Text


Progress Note Date: 22


Postoperative day 1 status post  section under spinal anesthesia, and i

ntrathecal morphine given for postoperative analgesia, patient doing well, there

is no anesthesia related complications, 


Patient had no headache, vital signs stable , 


Assessment and plan= postop day 1 status post , doing well there is no 

anesthesia related  complication.

## 2022-12-22 VITALS — TEMPERATURE: 98.5 F | HEART RATE: 95 BPM | DIASTOLIC BLOOD PRESSURE: 79 MMHG | SYSTOLIC BLOOD PRESSURE: 120 MMHG

## 2022-12-22 LAB
BASOPHILS # BLD AUTO: 0 K/UL (ref 0–0.2)
BASOPHILS NFR BLD AUTO: 0 %
EOSINOPHIL # BLD AUTO: 0 K/UL (ref 0–0.7)
EOSINOPHIL NFR BLD AUTO: 0 %
ERYTHROCYTE [DISTWIDTH] IN BLOOD BY AUTOMATED COUNT: 2.89 M/UL (ref 3.8–5.4)
ERYTHROCYTE [DISTWIDTH] IN BLOOD: 15.8 % (ref 11.5–15.5)
HCT VFR BLD AUTO: 25 % (ref 34–46)
HGB BLD-MCNC: 8.1 GM/DL (ref 11.4–16)
LYMPHOCYTES # SPEC AUTO: 1.5 K/UL (ref 1–4.8)
LYMPHOCYTES NFR SPEC AUTO: 10 %
MCH RBC QN AUTO: 27.9 PG (ref 25–35)
MCHC RBC AUTO-ENTMCNC: 32.2 G/DL (ref 31–37)
MCV RBC AUTO: 86.5 FL (ref 80–100)
MONOCYTES # BLD AUTO: 0.4 K/UL (ref 0–1)
MONOCYTES NFR BLD AUTO: 3 %
NEUTROPHILS # BLD AUTO: 12.4 K/UL (ref 1.3–7.7)
NEUTROPHILS NFR BLD AUTO: 85 %
PLATELET # BLD AUTO: 192 K/UL (ref 150–450)
WBC # BLD AUTO: 14.6 K/UL (ref 3.8–10.6)

## 2022-12-22 RX ADMIN — Medication SCH MG: at 08:20

## 2022-12-22 RX ADMIN — DOCUSATE SODIUM AND SENNOSIDES SCH EACH: 50; 8.6 TABLET ORAL at 08:20

## 2022-12-22 RX ADMIN — IBUPROFEN SCH: 600 TABLET ORAL at 18:12

## 2022-12-22 RX ADMIN — IBUPROFEN SCH MG: 600 TABLET ORAL at 08:32

## 2022-12-22 RX ADMIN — ACETAMINOPHEN SCH: 500 TABLET ORAL at 11:25

## 2022-12-22 RX ADMIN — ACETAMINOPHEN SCH MG: 500 TABLET ORAL at 04:40

## 2022-12-22 RX ADMIN — ACETAMINOPHEN SCH: 500 TABLET ORAL at 18:12

## 2022-12-22 NOTE — P.DS
Providers


Date of admission: 


22 05:06





Expected date of discharge: 22


Attending physician: 


Enrique Mosher





Primary care physician: 


Stated None








- Discharge Diagnosis(es)


(1) 39 weeks gestation of pregnancy


Current Visit: Yes   Status: Acute   





(2) Spontaneous rupture of amniotic membranes


Current Visit: Yes   Status: Acute   





(3) Normal labor


Current Visit: Yes   Status: Acute   


Hospital Course: 





Please see dictated H&P for intimate details of this patient's admission.  Brief

summary this is a 31-year-old  2 para 0 female 39 and one sevenths weeks 

admitted to labor and delivery with spontaneous rupture membranes in active 

labor.  Patient's subsequent goes on to have a primary low transverse  

section for cephalopelvic dystocia/failure to descend.  Please see dictated 

operative note.  Patient did have a significant amount of blood loss at the time

of her surgery.  This did resolve however her postoperative hemoglobin was 8.  

Patient started on iron therapy and she has no further bleeding.  Postoperative 

2 patient's felt be stable for discharge home follow up with me in 1 week.


Procedures: 





Primary low transverse  section


Patient Condition at Discharge: Good





Plan - Discharge Summary


New Discharge Prescriptions: 


New


   Ferrous Sulfate [Iron (65 MG Elemental)] 325 mg PO BID-W/MEALS #60 tab


   Ibuprofen [Motrin] 600 mg PO Q6H #30 tab


   oxyCODONE HCL [OxyIR] 5 mg PO Q4HR PRN #18 tab


     PRN Reason: Pain Scale 4 - 6





No Action


   Prenatal Vit No.179/Iron/Folic [Prenatal Tablet] 1 tab PO DAILY


Discharge Medication List





Prenatal Vit No.179/Iron/Folic [Prenatal Tablet] 1 tab PO DAILY 22 

[History]


Ferrous Sulfate [Iron (65 MG Elemental)] 325 mg PO BID-W/MEALS #60 tab 22 

[Rx]


Ibuprofen [Motrin] 600 mg PO Q6H #30 tab 22 [Rx]


oxyCODONE HCL [OxyIR] 5 mg PO Q4HR PRN #18 tab 22 [Rx]








Follow up Appointment(s)/Referral(s): 


Enrique Mosher MD [STAFF PHYSICIAN] - 23 8:30 am (post Op appointment 

2023)


Patient Instructions/Handouts:   (DC), Iron Deficiency Anemia (GEN), 

Iron Rich Diet (DC)


Activity/Diet/Wound Care/Special Instructions: 


No strenuous activity or heavy lifting for 6 weeks.  No intercourse or anything 

per vagina for 6 weeks.  Please call if any fever, chills, excessive vaginal 

bleeding, and/or abdominal pain.


Discharge Disposition: HOME SELF-CARE

## 2022-12-22 NOTE — P.PNOBGPC
Subjective





- Subjective


Patient reports: Reports appetite normal, Reports voiding normally, Reports pain

well controlled, Reports ambulating normally


[place]: doing well





Objective





- Vital Signs


Latest vital signs: 


                                   Vital Signs











  Temp Pulse Resp BP Pulse Ox


 


 22 00:00  98.7 F  88  16  100/68  96


 


 22 16:00  98.5 F  101 H  16  113/73  95


 


 22 12:00  98.1 F  98  17  103/69  95


 


 22 08:00  97.8 F  82  15  93/54  96








                                Intake and Output











 22





 14:59 22:59 06:59


 


Output Total 800 250 


 


Balance -800 -250 


 


Output:   


 


  Urine 800 250 


 


Other:   


 


  # Voids 1 1 2














- Exam


Lungs: bilateral: normal


Chest: Normal S1, Normal S2


Extremities: Present: normal


Abdomen: Present: normal appearance, soft.  Absent: distention, tenderness


Incision: Present: normal, dry, intact


Uterus: Present: normal, firm





- Labs


Labs: 


                  Abnormal Lab Results - Last 24 Hours (Table)











  22 Range/Units





  07:26 


 


WBC  16.6 H  (3.8-10.6)  k/uL


 


RBC  2.82 L  (3.80-5.40)  m/uL


 


Hgb  8.0 L D  (11.4-16.0)  gm/dL


 


Hct  24.6 L  (34.0-46.0)  %


 


Neutrophils #  14.7 H  (1.3-7.7)  k/uL














Assessment and Plan


Assessment: 





Postoperative day #2.  Patient is resting without new complaints wishes to go 

home.  Hemoglobin felt from 12 to 8 which is consistent with reported estimated 

blood loss.  She is having no further bleeding, and vital signs are stable.  I 

want a repeat her CBC today.  We've already begun iron therapy.  Patient is 

tolerating regular diet, ambulating, and urinating without difficulty.  I did 

discuss continued observation here in the Hospital however due to the patient 

living an hour away is pending storm refills best to watch her today and 

discharge home later today.  I believe she stable for discharge home she does 

have assistance.  Plan is to check a CBC, continue routine postoperative care 

discharge home later in the afternoon today.


(1) 39 weeks gestation of pregnancy


Current Visit: Yes   Status: Acute   Code(s): Z3A.39 - 39 WEEKS GESTATION OF 

PREGNANCY   SNOMED Code(s): 79989754


   





(2) Spontaneous rupture of amniotic membranes


Current Visit: Yes   Status: Acute   Code(s): YJP6564 -    SNOMED Code(s): 

628356768


   





(3) Normal labor


Current Visit: Yes   Status: Acute   Code(s): O80 - ENCOUNTER FOR FULL-TERM 

UNCOMPLICATED DELIVERY; Z37.9 - OUTCOME OF DELIVERY, UNSPECIFIED   SNOMED 

Code(s): 16407588